# Patient Record
Sex: FEMALE | URBAN - METROPOLITAN AREA
[De-identification: names, ages, dates, MRNs, and addresses within clinical notes are randomized per-mention and may not be internally consistent; named-entity substitution may affect disease eponyms.]

---

## 2024-03-15 ENCOUNTER — APPOINTMENT (OUTPATIENT)
Dept: URBAN - METROPOLITAN AREA CLINIC 309 | Age: 44
Setting detail: DERMATOLOGY
End: 2024-03-15

## 2024-03-15 DIAGNOSIS — Z41.9 ENCOUNTER FOR PROCEDURE FOR PURPOSES OTHER THAN REMEDYING HEALTH STATE, UNSPECIFIED: ICD-10-CM

## 2024-03-15 PROCEDURE — OTHER ADDITIONAL NOTES: OTHER

## 2024-03-15 PROCEDURE — OTHER PHOTO-DOCUMENTATION: OTHER

## 2024-03-15 PROCEDURE — OTHER MIPS QUALITY: OTHER

## 2024-03-15 PROCEDURE — OTHER COSMETIC CONSULTATION: DYSPORT: OTHER

## 2024-03-15 PROCEDURE — OTHER COSMETIC CONSULTATION: CUTERA SECRET PRO: OTHER

## 2024-03-15 PROCEDURE — OTHER COSMETIC CONSULTATION: FILLERS: OTHER

## 2024-03-15 NOTE — PROCEDURE: MIPS QUALITY
Quality 226: Preventive Care And Screening: Tobacco Use: Screening And Cessation Intervention: Patient screened for tobacco use and is an ex/non-smoker
Quality 134: Screening For Clinical Depression And Follow-Up Plan: The patient was screened for depression and the screen was negative and no follow up required
Detail Level: Detailed
Quality 130: Documentation Of Current Medications In The Medical Record: Current Medications Documented

## 2024-03-15 NOTE — PROCEDURE: ADDITIONAL NOTES
Additional Notes: Verbal rx ordered for Valium 2 mg Take 1 tablet PO prn anxiety. Dispensed 2 tablets.\\n\\nPt would like to do RFMN to face and neck ($1000), and moderate CO2 laser to face ($1000) together; will proceed with these procedures at the next visit.
Render Risk Assessment In Note?: no
Detail Level: Simple

## 2024-03-29 ENCOUNTER — APPOINTMENT (OUTPATIENT)
Dept: URBAN - METROPOLITAN AREA CLINIC 309 | Age: 44
Setting detail: DERMATOLOGY
End: 2024-03-29

## 2024-03-29 DIAGNOSIS — Z41.9 ENCOUNTER FOR PROCEDURE FOR PURPOSES OTHER THAN REMEDYING HEALTH STATE, UNSPECIFIED: ICD-10-CM

## 2024-03-29 PROCEDURE — OTHER ADDITIONAL NOTES: OTHER

## 2024-03-29 PROCEDURE — OTHER PRESCRIPTION: OTHER

## 2024-03-29 PROCEDURE — OTHER CUTERA SECRET PRO: OTHER

## 2024-03-29 RX ORDER — DOXYCYCLINE 100 MG/1
TABLET, FILM COATED ORAL
Qty: 10 | Refills: 0 | Status: ERX | COMMUNITY
Start: 2024-03-29

## 2024-03-29 ASSESSMENT — LOCATION SIMPLE DESCRIPTION DERM
LOCATION SIMPLE: INFERIOR FOREHEAD
LOCATION SIMPLE: NOSE
LOCATION SIMPLE: LEFT CHEEK
LOCATION SIMPLE: RIGHT CHEEK
LOCATION SIMPLE: RIGHT CHEEK
LOCATION SIMPLE: LEFT ANTERIOR NECK
LOCATION SIMPLE: LEFT ANTERIOR NECK
LOCATION SIMPLE: SUPERIOR FOREHEAD
LOCATION SIMPLE: CHIN
LOCATION SIMPLE: LEFT CHEEK
LOCATION SIMPLE: RIGHT ANTERIOR NECK

## 2024-03-29 ASSESSMENT — LOCATION DETAILED DESCRIPTION DERM
LOCATION DETAILED: SUPERIOR MID FOREHEAD
LOCATION DETAILED: LEFT CHIN
LOCATION DETAILED: INFERIOR MID FOREHEAD
LOCATION DETAILED: RIGHT SUPERIOR ANTERIOR NECK
LOCATION DETAILED: LEFT INFERIOR ANTERIOR NECK
LOCATION DETAILED: LEFT SUPERIOR ANTERIOR NECK
LOCATION DETAILED: LEFT INFERIOR CENTRAL MALAR CHEEK
LOCATION DETAILED: RIGHT CENTRAL MALAR CHEEK
LOCATION DETAILED: NASAL SUPRATIP
LOCATION DETAILED: RIGHT CENTRAL MALAR CHEEK
LOCATION DETAILED: LEFT CENTRAL MALAR CHEEK

## 2024-03-29 ASSESSMENT — LOCATION ZONE DERM
LOCATION ZONE: FACE
LOCATION ZONE: NOSE
LOCATION ZONE: NECK
LOCATION ZONE: FACE
LOCATION ZONE: NECK

## 2024-03-29 NOTE — PROCEDURE: CUTERA SECRET PRO
Add Another Setting?: no
Rf (Ms): 200
Treatment Type: Secret Pro Revive
Delay Time (Ms): 400
Handpiece: RF Microneedling Handpiece
Overlap (%): 50
Pre-Procedure Care: Prior to the procedure the patient and all present had protective eyewear in place and a warning sign was placed on the door.
Consent: Prior to the procedure, written consent obtained. Risks reviewed including but not limited to discomfort, pain, redness, swelling, crusting, scabbing, blistering, scarring, darker or lighter pigmentary change, and infection, recurrence and incomplete removal.
Procedure Note: Treatment was administered using the setting parameters listed above.
Depth (Mm): 2
Shot Count: 420
End-Point And Post-Procedure Care: The procedure continued until mild erythema was noted.  Immediately following the procedure, a [employer] CE Ferulic was dripped onto the treatment areas followed by Alastin Healing Balm.  Post care reviewed with patient.
Handpiece: CO2 Fractional Handpiece
Mode: 0.5s
Post-Care Instructions: I reviewed with the patient in detail post-care instructions and expectations.  Patient is to practice strict sun avoidance and sun protection. Procedure tolerated well and without any immediate post-procedural complications.
Energy (Mj): 12-18
Topical Anesthesia Type: BLT cream (benzocaine 20%, lidocaine 10%, tetracaine 10%)
Delay Time (Ms): 250
Scanning: right to left
I-Stack: 2nd
Detail Level: Simple
Repeat: double
Length Of Topical Anesthesia Application (Optional): 60 minutes
Distance (Mm): 1.5
Treatment Number: 1
Tip Type: 64 Pin Semi-Insulated Tip
Shape: square
Skin Type: I
Intensity (%): 30
Eye Protection: opaque goggles
Rf (Ms): 350
External Cooling Fan Speed: 0
Add Another Setting?: yes

## 2024-03-29 NOTE — PROCEDURE: ADDITIONAL NOTES
Detail Level: Simple
Render Risk Assessment In Note?: no
Additional Notes: Patient purchased [employer] post ablative kit system today

## 2024-04-12 ENCOUNTER — APPOINTMENT (OUTPATIENT)
Dept: URBAN - METROPOLITAN AREA CLINIC 309 | Age: 44
Setting detail: DERMATOLOGY
End: 2024-04-12

## 2024-04-12 DIAGNOSIS — Z41.9 ENCOUNTER FOR PROCEDURE FOR PURPOSES OTHER THAN REMEDYING HEALTH STATE, UNSPECIFIED: ICD-10-CM

## 2024-04-12 PROCEDURE — OTHER RHA REDENSITY INJECTION: OTHER

## 2024-04-12 PROCEDURE — OTHER BOTOX: OTHER

## 2024-04-12 PROCEDURE — OTHER ADDITIONAL NOTES: OTHER

## 2024-04-12 ASSESSMENT — LOCATION DETAILED DESCRIPTION DERM
LOCATION DETAILED: RIGHT INFERIOR MEDIAL MALAR CHEEK
LOCATION DETAILED: LEFT INFERIOR MEDIAL MALAR CHEEK

## 2024-04-12 ASSESSMENT — LOCATION SIMPLE DESCRIPTION DERM
LOCATION SIMPLE: LEFT CHEEK
LOCATION SIMPLE: RIGHT CHEEK

## 2024-04-12 ASSESSMENT — LOCATION ZONE DERM: LOCATION ZONE: FACE

## 2024-04-12 NOTE — PROCEDURE: RHA REDENSITY INJECTION
Use Map Statement For Sites (Optional): No
Additional Area 5 Volume In Cc: 0
Additional Anesthesia Volume In Cc: 6
Detail Level: Detailed
Anesthesia Type: 1% lidocaine with epinephrine
Nasolabial Folds Filler Volume In Cc: 1
Expiration Date (Month Year): 08-
Filler: RHA Redensity
Map Statment: See Attach Map for Complete Details
Show Inventory Tab: Hide
Post-Care Instructions: Patient instructed to apply ice to reduce swelling.
Anesthesia Volume In Cc: 0.5
Lot #: [id number]
Procedural Text: The filler was administered to the treatment areas noted above.
Consent: Written consent obtained. Risks include but not limited to bruising, beading, irregular texture, ulceration, infection, allergic reaction, scar formation, incomplete augmentation, temporary nature, procedural pain.

## 2024-04-12 NOTE — PROCEDURE: BOTOX
Lcl Root Units: 0
Comments: Procedure tolerated well and without any post-procedural complications. 12 total Botox U injected in above areas today into the glabellar complex.
Additional Area 3 Location: Frontalis
Show Orbicularis Oculi Units: Yes
Show Right And Left Pupillary Line Units: No
Additional Area 5 Location: bilateral tail of eyebrows
Glabellar Complex Units: 24
Additional Area 5 Units: 6
Additional Area 2 Location: nasal alae
Additional Area 4 Location: Crows feet
Consent: Written consent obtained. Risks include but not limited to lid/brow ptosis, bruising, swelling, diplopia, temporary effect, incomplete chemical denervation.
Detail Level: Detailed
Periorbital Skin Units: 12
Dilution (U/0.1 Cc): 4
Post-Care Instructions: Patient instructed to not lie down for 4 hours and limit physical activity for 24 hours.
Incrementing Botox Units: By 0.5 Units
Additional Area 1 Location: Superior vermilion border
Show Inventory Tab: Show